# Patient Record
Sex: FEMALE | Race: WHITE
[De-identification: names, ages, dates, MRNs, and addresses within clinical notes are randomized per-mention and may not be internally consistent; named-entity substitution may affect disease eponyms.]

---

## 2019-12-12 ENCOUNTER — HOSPITAL ENCOUNTER (INPATIENT)
Dept: HOSPITAL 54 - GPS | Age: 59
LOS: 11 days | Discharge: NURSING, REHAB OR LONG-TERM CARE | DRG: 885 | End: 2019-12-23
Attending: INTERNAL MEDICINE | Admitting: PSYCHIATRY & NEUROLOGY
Payer: MEDICARE

## 2019-12-12 VITALS — DIASTOLIC BLOOD PRESSURE: 91 MMHG | SYSTOLIC BLOOD PRESSURE: 133 MMHG

## 2019-12-12 VITALS — SYSTOLIC BLOOD PRESSURE: 143 MMHG | DIASTOLIC BLOOD PRESSURE: 79 MMHG

## 2019-12-12 VITALS — DIASTOLIC BLOOD PRESSURE: 93 MMHG | SYSTOLIC BLOOD PRESSURE: 132 MMHG

## 2019-12-12 VITALS — DIASTOLIC BLOOD PRESSURE: 89 MMHG | SYSTOLIC BLOOD PRESSURE: 135 MMHG

## 2019-12-12 VITALS — SYSTOLIC BLOOD PRESSURE: 151 MMHG | DIASTOLIC BLOOD PRESSURE: 88 MMHG

## 2019-12-12 VITALS — WEIGHT: 185 LBS | BODY MASS INDEX: 29.73 KG/M2 | HEIGHT: 66 IN

## 2019-12-12 DIAGNOSIS — E11.40: ICD-10-CM

## 2019-12-12 DIAGNOSIS — F31.9: Primary | ICD-10-CM

## 2019-12-12 DIAGNOSIS — R45.851: ICD-10-CM

## 2019-12-12 DIAGNOSIS — F29: ICD-10-CM

## 2019-12-12 DIAGNOSIS — I10: ICD-10-CM

## 2019-12-12 DIAGNOSIS — L97.519: ICD-10-CM

## 2019-12-12 DIAGNOSIS — Z79.899: ICD-10-CM

## 2019-12-12 DIAGNOSIS — F41.9: ICD-10-CM

## 2019-12-12 DIAGNOSIS — E11.621: ICD-10-CM

## 2019-12-12 DIAGNOSIS — J96.00: ICD-10-CM

## 2019-12-12 DIAGNOSIS — Z91.5: ICD-10-CM

## 2019-12-12 DIAGNOSIS — L03.031: ICD-10-CM

## 2019-12-12 DIAGNOSIS — E03.9: ICD-10-CM

## 2019-12-12 DIAGNOSIS — Z96.653: ICD-10-CM

## 2019-12-12 DIAGNOSIS — Z91.19: ICD-10-CM

## 2019-12-12 LAB
ALBUMIN SERPL BCP-MCNC: 2.9 G/DL (ref 3.4–5)
ALP SERPL-CCNC: 70 U/L (ref 46–116)
ALT SERPL W P-5'-P-CCNC: 15 U/L (ref 12–78)
APPEARANCE UR: CLEAR
AST SERPL W P-5'-P-CCNC: 12 U/L (ref 15–37)
BILIRUB SERPL-MCNC: 0.3 MG/DL (ref 0.2–1)
BILIRUB UR QL STRIP: NEGATIVE
BUN SERPL-MCNC: 10 MG/DL (ref 7–18)
CALCIUM SERPL-MCNC: 8.5 MG/DL (ref 8.5–10.1)
CHLORIDE SERPL-SCNC: 99 MMOL/L (ref 98–107)
CHOLEST SERPL-MCNC: 116 MG/DL (ref ?–200)
CO2 SERPL-SCNC: 30 MMOL/L (ref 21–32)
COLOR UR: YELLOW
CREAT SERPL-MCNC: 0.6 MG/DL (ref 0.6–1.3)
GLUCOSE SERPL-MCNC: 104 MG/DL (ref 74–106)
GLUCOSE UR STRIP-MCNC: NEGATIVE MG/DL
HDLC SERPL-MCNC: 39 MG/DL (ref 40–60)
HGB UR QL STRIP: NEGATIVE ERY/UL
KETONES UR STRIP-MCNC: NEGATIVE MG/DL
LDLC SERPL DIRECT ASSAY-MCNC: 54 MG/DL (ref 0–99)
LEUKOCYTE ESTERASE UR QL STRIP: NEGATIVE
NITRITE UR QL STRIP: NEGATIVE
PH UR STRIP: 8 [PH] (ref 5–8)
POTASSIUM SERPL-SCNC: 3.9 MMOL/L (ref 3.5–5.1)
PROT SERPL-MCNC: 6.3 G/DL (ref 6.4–8.2)
PROT UR QL STRIP: NEGATIVE MG/DL
SODIUM SERPL-SCNC: 136 MMOL/L (ref 136–145)
TRIGL SERPL-MCNC: 162 MG/DL (ref 30–150)
UROBILINOGEN UR STRIP-MCNC: 0.2 EU/DL

## 2019-12-12 RX ADMIN — OXCARBAZEPINE SCH MG: 150 TABLET, FILM COATED ORAL at 17:09

## 2019-12-12 RX ADMIN — LEVOTHYROXINE SODIUM SCH MCG: 125 TABLET ORAL at 08:09

## 2019-12-12 RX ADMIN — Medication SCH EACH: at 12:11

## 2019-12-12 RX ADMIN — Medication SCH MG: at 06:13

## 2019-12-12 RX ADMIN — METOPROLOL TARTRATE SCH MG: 25 TABLET, FILM COATED ORAL at 20:30

## 2019-12-12 RX ADMIN — Medication SCH MG: at 17:07

## 2019-12-12 RX ADMIN — Medication SCH MG: at 17:09

## 2019-12-12 RX ADMIN — Medication SCH OZ: at 08:10

## 2019-12-12 RX ADMIN — ATORVASTATIN CALCIUM SCH MG: 40 TABLET, FILM COATED ORAL at 08:11

## 2019-12-12 RX ADMIN — Medication SCH EACH: at 08:08

## 2019-12-12 RX ADMIN — METOPROLOL TARTRATE SCH MG: 25 TABLET, FILM COATED ORAL at 08:19

## 2019-12-12 NOTE — NUR
GPS RN ADMISSION NOTES



PATIENT IS A 59 YEAR OLD FEMALE ADMITTED FROM [facility], PLACED ON A 5150 
HOLD FOR PSYCHOSIS. PER HOLD, PATIENT ADMITS SHE IS TREATED FOR BIPOLAR DISORDER BY MD BUT 
HAS NOT RECEIVED TREATMENT IN 3 MONTHS. PT. REPORTS, SHE IMPULSIVELY TOOK 60 PILLS OF 
CODEINE IN A SUICIDE ATTEMPT, NOT CARING IF SHE LIVES OR DIES. PATIENT DOES NOT EXPRESS ANY 
REMORSE IN REGARDS TO THE OVERDOSE. UPON FACE TO FACE ASSESSMENT, PATIENT IS ALERT AND 
ORIENTED X2, FORGETFUL, DISORIENTED, DISORGANIZED. CLEAR SPEECH. DENIES SI/HI/VAH AT THIS 
TIME. COOPERATIVE/UNCOOPERATIVE. FLAT AFFECT. VSS. NO ACUTE DISTRESS NOTED. NO C/O PAIN 
VERBALIZED. SKIN CHECK COMPLETE WITH PICTURES PLACED IN CHART, PT & WOUND CARE CONSULT 
ORDERED. PT. IS UNDER THE PSYCHIATRIC CARE OF DR. CHANG & THE MEDICAL CARE OF DR. MONTEZ. PT'S BELONGINGS WERE INVENTORIED & CHECKED FOR CONTRABAND. PT. EDUCATED ON THE 
USE OF CALL BELL. BED SIDE RAILS ARE UP X2 FOR SAFETY. REFUSE TO SIGN CONSENTS DUE TO BEING 
LETHARGIC AT THIS TIME. MRSA SWAB COMPLETE. PATIENT IS NONAMBULATORY/BED BOUND. PATIENT'S 
RIGHTS HANDBOOK GIVEN. MD MADE AWARE. JEANNE RAMÍREZ MADE AWARE OF PATIENT'S ADMISSION TO 
SOH/GPS UNIT. ALL NEEDS ATTENDED TO & MET. SAFETY MEASURES MAINTAINED. BED ALARM ON, BED IN 
LOW/LOCKED POSITION. WILL CONTINUE TO MONITOR PATIENT Q15 MINUTES FOR SAFETY AND BEHAVIOR.

## 2019-12-12 NOTE — NUR
GPS RN NOTE



PATIENT HAS H/O MRSA, NOW MEDICALLY CLEARED, NOT ON ISOLATION AT THIS TIME. CHARGE MADE 
NURSE AWARE, PER CHARGE NURSE.

## 2019-12-12 NOTE — NUR
Friend Contact: NIKOLAS called the pts friend, Carmelina ([phone]), and left a voicemail stating 
that the NIKOLAS would like to speak to her regarding the pts treatment and discharge plan.

-------------------------------------------------------------------------------

Addendum: 12/12/19 at 1544 by MAXWELL CONNOR

-------------------------------------------------------------------------------

Digna Dunbar*

## 2019-12-12 NOTE — NUR
Initial Discharge Plan: Pt currently resides with her friend, Digna ([phone]), in a 
home located at [address] [place] [address]; ([phone]). Per pt, she would 
like to return to her home. NIKOLAS will work with the pt and the MD regarding appropriate 
discharge planning. SW will form a safe and proper discharge plan.

## 2019-12-13 VITALS — SYSTOLIC BLOOD PRESSURE: 157 MMHG | DIASTOLIC BLOOD PRESSURE: 95 MMHG

## 2019-12-13 VITALS — SYSTOLIC BLOOD PRESSURE: 141 MMHG | DIASTOLIC BLOOD PRESSURE: 78 MMHG

## 2019-12-13 VITALS — SYSTOLIC BLOOD PRESSURE: 127 MMHG | DIASTOLIC BLOOD PRESSURE: 82 MMHG

## 2019-12-13 RX ADMIN — ATORVASTATIN CALCIUM SCH MG: 40 TABLET, FILM COATED ORAL at 08:24

## 2019-12-13 RX ADMIN — LEVOTHYROXINE SODIUM SCH MCG: 125 TABLET ORAL at 07:34

## 2019-12-13 RX ADMIN — OXCARBAZEPINE SCH MG: 150 TABLET, FILM COATED ORAL at 09:08

## 2019-12-13 RX ADMIN — CEPHALEXIN SCH MG: 500 CAPSULE ORAL at 21:12

## 2019-12-13 RX ADMIN — Medication SCH MG: at 17:49

## 2019-12-13 RX ADMIN — METOPROLOL TARTRATE SCH MG: 25 TABLET, FILM COATED ORAL at 08:24

## 2019-12-13 RX ADMIN — OXCARBAZEPINE SCH MG: 150 TABLET, FILM COATED ORAL at 17:49

## 2019-12-13 RX ADMIN — Medication SCH OZ: at 08:27

## 2019-12-13 RX ADMIN — Medication SCH MG: at 05:13

## 2019-12-13 RX ADMIN — METOPROLOL TARTRATE SCH MG: 25 TABLET, FILM COATED ORAL at 20:42

## 2019-12-13 RX ADMIN — DULOXETINE HYDROCHLORIDE SCH MG: 30 CAPSULE, DELAYED RELEASE ORAL at 08:27

## 2019-12-13 NOTE — NUR
Substance Abuse Intervention: SW conducted a substance abuse intervention with the pt 
regarding her overdose on codeine.

## 2019-12-13 NOTE — NUR
WOUND CARE CONSULT: PT PRESENTS WITH RT 2ND TOE ULCER, AND MULTIPLE AREAS OF 
BRUISING/DISCOLORATION TO SKIN INCLUDING RT ANKLE AREA, PRESENT ON ADMISSION. 
RECOMMENDATIONS MADE FOR SKIN PROTECTION. DISCUSSED WITH NURSING STAFF. DR BRIDGES AWARE 
OF DPM CONSULT REQUEST FOR TOE. WILL SEE PRN. 

-------------------------------------------------------------------------------

Addendum: 12/13/19 at 1020 by LYLA RICK WNDNU

-------------------------------------------------------------------------------

Amended: Links added.

## 2019-12-13 NOTE — NUR
Friend Contact: NIKOLAS called the pts friend, Digna ([phone]), and informed her that the pt 
is requesting to be discharged to [facility] and the pts friend stated that would 
be more appropriate because she can no longer care for her.

## 2019-12-14 VITALS — SYSTOLIC BLOOD PRESSURE: 120 MMHG | DIASTOLIC BLOOD PRESSURE: 80 MMHG

## 2019-12-14 VITALS — DIASTOLIC BLOOD PRESSURE: 89 MMHG | SYSTOLIC BLOOD PRESSURE: 134 MMHG

## 2019-12-14 VITALS — DIASTOLIC BLOOD PRESSURE: 85 MMHG | SYSTOLIC BLOOD PRESSURE: 138 MMHG

## 2019-12-14 RX ADMIN — CEPHALEXIN SCH MG: 500 CAPSULE ORAL at 21:12

## 2019-12-14 RX ADMIN — MUPIROCIN SCH APPLIC: 20 OINTMENT TOPICAL at 09:25

## 2019-12-14 RX ADMIN — DULOXETINE HYDROCHLORIDE SCH MG: 30 CAPSULE, DELAYED RELEASE ORAL at 09:24

## 2019-12-14 RX ADMIN — CEPHALEXIN SCH MG: 500 CAPSULE ORAL at 09:23

## 2019-12-14 RX ADMIN — Medication SCH MG: at 16:26

## 2019-12-14 RX ADMIN — METOPROLOL TARTRATE SCH MG: 25 TABLET, FILM COATED ORAL at 21:13

## 2019-12-14 RX ADMIN — OXCARBAZEPINE SCH MG: 150 TABLET, FILM COATED ORAL at 09:24

## 2019-12-14 RX ADMIN — OXCARBAZEPINE SCH MG: 150 TABLET, FILM COATED ORAL at 16:27

## 2019-12-14 RX ADMIN — Medication SCH MG: at 05:02

## 2019-12-14 RX ADMIN — ATORVASTATIN CALCIUM SCH MG: 40 TABLET, FILM COATED ORAL at 09:23

## 2019-12-14 RX ADMIN — LEVOTHYROXINE SODIUM SCH MCG: 125 TABLET ORAL at 09:24

## 2019-12-14 RX ADMIN — METOPROLOL TARTRATE SCH MG: 25 TABLET, FILM COATED ORAL at 09:23

## 2019-12-14 RX ADMIN — Medication SCH OZ: at 09:26

## 2019-12-15 VITALS — DIASTOLIC BLOOD PRESSURE: 90 MMHG | SYSTOLIC BLOOD PRESSURE: 141 MMHG

## 2019-12-15 VITALS — DIASTOLIC BLOOD PRESSURE: 88 MMHG | SYSTOLIC BLOOD PRESSURE: 135 MMHG

## 2019-12-15 VITALS — DIASTOLIC BLOOD PRESSURE: 89 MMHG | SYSTOLIC BLOOD PRESSURE: 128 MMHG

## 2019-12-15 RX ADMIN — Medication SCH MG: at 17:06

## 2019-12-15 RX ADMIN — CEPHALEXIN SCH MG: 500 CAPSULE ORAL at 21:38

## 2019-12-15 RX ADMIN — METOPROLOL TARTRATE SCH MG: 25 TABLET, FILM COATED ORAL at 08:48

## 2019-12-15 RX ADMIN — OXCARBAZEPINE SCH MG: 150 TABLET, FILM COATED ORAL at 17:06

## 2019-12-15 RX ADMIN — Medication SCH OZ: at 08:58

## 2019-12-15 RX ADMIN — METOPROLOL TARTRATE SCH MG: 25 TABLET, FILM COATED ORAL at 21:39

## 2019-12-15 RX ADMIN — Medication SCH MG: at 06:18

## 2019-12-15 RX ADMIN — OXCARBAZEPINE SCH MG: 150 TABLET, FILM COATED ORAL at 08:58

## 2019-12-15 RX ADMIN — CEPHALEXIN SCH MG: 500 CAPSULE ORAL at 08:49

## 2019-12-15 RX ADMIN — Medication SCH MG: at 17:05

## 2019-12-15 RX ADMIN — ATORVASTATIN CALCIUM SCH MG: 40 TABLET, FILM COATED ORAL at 08:49

## 2019-12-15 RX ADMIN — DULOXETINE HYDROCHLORIDE SCH MG: 30 CAPSULE, DELAYED RELEASE ORAL at 08:48

## 2019-12-15 RX ADMIN — MUPIROCIN SCH APPLIC: 20 OINTMENT TOPICAL at 08:58

## 2019-12-15 RX ADMIN — LEVOTHYROXINE SODIUM SCH MCG: 125 TABLET ORAL at 06:40

## 2019-12-15 NOTE — NUR
RN CLOSING NOTES: GPS OVERFLOW



PATIENT A/O X2-3 WITH PERIOD OF CONFUSION AND FORGETFULNESS, RESPONSIVE TO ALL STIMULI. 
SITTER ON BEDSIDE. RESPIRATION EVEN AND NON LABORED WITH NO ACUTE RESPIRATORY DISTRESS. 
ABDOMEN SOFT AND NON DISTENDED WITH ACTIVE BOWEL SOUNDS, BLADDER SCAN WITH 330 ML MAX, 
DIAPER REMAINS DRY. DENIES PAIN AND DISCOMFORT. SKIN WARM TO TOUCH AND DRY. BLE OFFLOAD. 
BEHAVIOR WITH CALM, COOPERATIVE, EPISODES OF CRYING FOR NO REASON, NO AH/VH,SI/HI, SUICIDAL 
VERBALIZATION. ALL CARE ATTENDED. BED IN LOCKED POSITION, SIDE RAILS X2, LOW BED. ENDORSED 
PATIENT CARE TO NEXT SHIFT.

## 2019-12-15 NOTE — NUR
RN NOTE- PT STATED ON INITIAL ASSESSMENT THIS MORNING THAT SHE HASN'T VOIDED IN 24 HOURS, 
THOUGH DENIES PAIN OR URGENCY. WHITNEY ALCANTAR ORDERED BLADDER SCAN AND IN AND OUT CATH FOR 
RETENTION > THAN 450 ML. BLADDER SCAN COMPLETED W FEMALE RAQUEL  ML RETENTION NOTED. IN 
AND OUT CATHETERIZATION PERFORMED BY FEMALE RAQUEL W 700ML UA REMOVED. NP KATHARINE NOTIFIED. 

-------------------------------------------------------------------------------

Addendum: 12/15/19 at 1156 by ELMO POTTER RN

-------------------------------------------------------------------------------

NP KATHARINE ORDERED CONTINUED MONITORING, BLADDER SCAN AND IN/OUT PRN.

## 2019-12-15 NOTE — NUR
RN NOTES GPS OVERFLOW



RECEIVED PATIENT FROM GPS TO MS 2, REPORT GOT FROM ELMO GARCÍA. A/O X3, RESPONSIVE TO ALL 
STIMULI. NO S/SX OF ACUTE RESPIRATORY DISTRESS. ABD SOFT AND NON DISTENDED, CONTINENT B&B. 
DIAPER DRY. NO BM TODAY. SKIN WARM TO TOUCH AND DRY. DENIES PAIN AND DISCOMFORT. SITTER 
PRESENT ON BED SIDE. ALL CONCERNS ATTENDED. BED IN LOCKED, LOW, SEMI YA POSITION. PT HAS 
NO BEHAVIOR OF SI/HI, AV/VH, SUICIDAL STATEMENT. WILL MONITOR CONTINUOUSLY.

## 2019-12-15 NOTE — NUR
RN NOTE- PT TRANSFERRED VIA GURNEY TO OVERFLOW AREA ROOM #206 AT THIS TIME. MEDICAL CHART, 
VALUABLES AND ONE TUBE BACTROBAN OINTMENT TRANSFERRED WITH PT. VSS, CALM, DIRECTABLE. REPORT 
GIVEN TO KILLIAN. RECEIVED AND UNDERSTOOD.

## 2019-12-15 NOTE — NUR
RN NOTES



DIAPER DRY. BLADDER SCAN  ML MAX. PT DENIES URGENCY, ABDOMINAL PAIN. WILL CONTINUE TO 
MONITOR.

## 2019-12-16 LAB
BASOPHILS # BLD AUTO: 0 /CMM (ref 0–0.2)
BASOPHILS NFR BLD AUTO: 0.5 % (ref 0–2)
BUN SERPL-MCNC: 17 MG/DL (ref 7–18)
CALCIUM SERPL-MCNC: 8.8 MG/DL (ref 8.5–10.1)
CHLORIDE SERPL-SCNC: 100 MMOL/L (ref 98–107)
CO2 SERPL-SCNC: 24 MMOL/L (ref 21–32)
CREAT SERPL-MCNC: 0.7 MG/DL (ref 0.6–1.3)
EOSINOPHIL NFR BLD AUTO: 1.4 % (ref 0–6)
GLUCOSE SERPL-MCNC: 137 MG/DL (ref 74–106)
HCT VFR BLD AUTO: 39 % (ref 33–45)
HGB BLD-MCNC: 12.7 G/DL (ref 11.5–14.8)
LYMPHOCYTES NFR BLD AUTO: 1.7 /CMM (ref 0.8–4.8)
LYMPHOCYTES NFR BLD AUTO: 20.6 % (ref 20–44)
MAGNESIUM SERPL-MCNC: 1.7 MG/DL (ref 1.8–2.4)
MCHC RBC AUTO-ENTMCNC: 33 G/DL (ref 31–36)
MCV RBC AUTO: 88 FL (ref 82–100)
MONOCYTES NFR BLD AUTO: 0.6 /CMM (ref 0.1–1.3)
MONOCYTES NFR BLD AUTO: 7.3 % (ref 2–12)
NEUTROPHILS # BLD AUTO: 5.9 /CMM (ref 1.8–8.9)
NEUTROPHILS NFR BLD AUTO: 70.2 % (ref 43–81)
PHOSPHATE SERPL-MCNC: 3 MG/DL (ref 2.5–4.9)
PLATELET # BLD AUTO: 308 /CMM (ref 150–450)
POTASSIUM SERPL-SCNC: 3.8 MMOL/L (ref 3.5–5.1)
RBC # BLD AUTO: 4.39 MIL/UL (ref 4–5.2)
SODIUM SERPL-SCNC: 134 MMOL/L (ref 136–145)
WBC NRBC COR # BLD AUTO: 8.3 K/UL (ref 4.3–11)

## 2019-12-16 RX ADMIN — CEPHALEXIN SCH MG: 500 CAPSULE ORAL at 08:22

## 2019-12-16 RX ADMIN — METOPROLOL TARTRATE SCH MG: 25 TABLET, FILM COATED ORAL at 08:24

## 2019-12-16 RX ADMIN — ACETAMINOPHEN PRN MG: 325 TABLET ORAL at 04:30

## 2019-12-16 RX ADMIN — OXCARBAZEPINE SCH MG: 150 TABLET, FILM COATED ORAL at 08:22

## 2019-12-16 RX ADMIN — METOPROLOL TARTRATE SCH MG: 25 TABLET, FILM COATED ORAL at 21:34

## 2019-12-16 RX ADMIN — OXCARBAZEPINE SCH MG: 150 TABLET, FILM COATED ORAL at 17:48

## 2019-12-16 RX ADMIN — Medication SCH OZ: at 08:25

## 2019-12-16 RX ADMIN — ACETAMINOPHEN PRN MG: 325 TABLET ORAL at 18:39

## 2019-12-16 RX ADMIN — MUPIROCIN SCH APPLIC: 20 OINTMENT TOPICAL at 08:25

## 2019-12-16 RX ADMIN — DULOXETINE HYDROCHLORIDE SCH MG: 30 CAPSULE, DELAYED RELEASE ORAL at 08:21

## 2019-12-16 RX ADMIN — Medication SCH MG: at 17:48

## 2019-12-16 RX ADMIN — Medication SCH MG: at 06:09

## 2019-12-16 RX ADMIN — LEVOTHYROXINE SODIUM SCH MCG: 125 TABLET ORAL at 07:53

## 2019-12-16 RX ADMIN — ATORVASTATIN CALCIUM SCH MG: 40 TABLET, FILM COATED ORAL at 08:22

## 2019-12-16 RX ADMIN — Medication SCH MG: at 17:47

## 2019-12-16 RX ADMIN — CEPHALEXIN SCH MG: 500 CAPSULE ORAL at 21:33

## 2019-12-16 NOTE — NUR
MS RN NOTES

PATIENT IN BED ALERT ORIENTED X 3. NO ACUTE DISTRESS NOTED. BREATHING UNLABORED.  SITTER AT 
BEDSIDE. SAFETY MEASURES IN PLACE. CALL LIGHT WITHIN REACH. WILL CONTINUE TO MONITOR 
ACCORDINGLY.

## 2019-12-16 NOTE — NUR
MS RN NOTES

PATIENT IN BED ALERT ORIENTED X 3. NO ACUTE DISTRESS NOTED. BREATHING UNLABORED. IV ACCESS 
PATIENT AND INTACT. NO REDNESS OR SWELLING NOTED. SITTER AT BEDSIDE. SAFETY MEASURES IN 
PLACE. CALL LIGHT WITHIN REACH. WILL CONTINUE TO MONITOR ACCORDINGLY.

-------------------------------------------------------------------------------

Addendum: 12/16/19 at 1441 by AMARJIT LOZOYA RN

-------------------------------------------------------------------------------

DISREGARD ABOVE NOTES, WRONG PATIENT.

## 2019-12-16 NOTE — NUR
MS RN NOTES



AWAKE & RESPONSIVE. NOT IN ANY DISTRESS. NO SOB NOTED. DENIES ANY PAIN OR DISCOMFORT AT THIS 
TIME. AM CARE DONE. MONITORED ACCORDINGLY. CALL LIGHT WITHIN REACH. BED IN LOWEST POSITION. 
SR UP X 2 FOR SAFETY. WILL ENDORSE TO NEXT SHIFT.

## 2019-12-16 NOTE — NUR
RN OPENING NOTES



RECEIVED REPORT FROM RACHAEL RNJURGEN. FOUND Pt AWAKE, RESTING IN BED. NO S/S OF ACUTE 
DISTRESS OR SOB NOTED. SITTER AT BEDSIDE. Pt IS A GPS OVF Pt ON A 5250 HOLD, EXP DATE: 
12/26/19. Pt IS A/OX3, VERBAL, ABLE TO MAKE NEEDS KNOWN. NO IV ACCESS PER GPS PROTOCOL. 
SAFETY MEASURES IN PLACE. BED LOW, LOCKED, HOB ELEVATED, SIDE RAILS UP, BED ALARM ON. SITTER 
AT BEDSIDE. WILL CONTINUE TO MONITOR Pt's CONDITION AND SAFETY THROUGHOUT THE NIGHT.

## 2019-12-16 NOTE — NUR
MS RN NOTES

PATIENT IN BED ALERT ORIENTED X 3. NO ACUTE DISTRESS NOTED. BREATHING UNLABORED.  SITTER AT 
BEDSIDE. DUE MEDICATIONS GIVEN, NO ASE NOTED. NEEDS ATTENDED AND ANTICIPATED. KEPT CLEAN DRY 
AND COMFORTABLE. SAFETY MEASURES IN PLACE. CALL LIGHT WITHIN REACH. WILL ENDORSE TO NIGHT 
NURSE FOR CONTINUITY OF CARE.

## 2019-12-16 NOTE — NUR
SNF Referral: NIKOLAS faxed a referral to [facility] with attention to 
Admissions to the fax number: [phone].

## 2019-12-16 NOTE — NUR
MS RN NOTES



PT UNABLE TO VOID. CHECKED RESIDUAL WITH BLADDER SCANNER = >611ML. PT REQUESTED FOR FEMALE 
NURSE TO DO STRAIGHT CATH. SHAINA RN DID STRAIGHT CATH AS ORDERED. DRAINED 700ML YELLOWISH 
OUTPUT. PT TOLERATED PROCEDURE WELL. WILL CONTINUE TO MONITOR.

## 2019-12-16 NOTE — NUR
MS RN NOTES

CLARIFIED WITH NP NATALIE CORRALES REGARDING ACCUCHECK ACHS AND INSULIN SAID DISCONTINUED AND 
WILL NOT ORDER.

## 2019-12-17 VITALS — DIASTOLIC BLOOD PRESSURE: 85 MMHG | SYSTOLIC BLOOD PRESSURE: 127 MMHG

## 2019-12-17 VITALS — DIASTOLIC BLOOD PRESSURE: 85 MMHG | SYSTOLIC BLOOD PRESSURE: 145 MMHG

## 2019-12-17 VITALS — DIASTOLIC BLOOD PRESSURE: 80 MMHG | SYSTOLIC BLOOD PRESSURE: 129 MMHG

## 2019-12-17 RX ADMIN — CEPHALEXIN SCH MG: 500 CAPSULE ORAL at 21:10

## 2019-12-17 RX ADMIN — METOPROLOL TARTRATE SCH MG: 25 TABLET, FILM COATED ORAL at 21:13

## 2019-12-17 RX ADMIN — Medication SCH OZ: at 08:25

## 2019-12-17 RX ADMIN — MUPIROCIN SCH APPLIC: 20 OINTMENT TOPICAL at 08:24

## 2019-12-17 RX ADMIN — METOPROLOL TARTRATE SCH MG: 25 TABLET, FILM COATED ORAL at 08:24

## 2019-12-17 RX ADMIN — CEPHALEXIN SCH MG: 500 CAPSULE ORAL at 08:24

## 2019-12-17 RX ADMIN — POLYETHYLENE GLYCOL 3350 PRN GM: 17 POWDER, FOR SOLUTION ORAL at 23:21

## 2019-12-17 RX ADMIN — DULOXETINE HYDROCHLORIDE SCH MG: 30 CAPSULE, DELAYED RELEASE ORAL at 08:24

## 2019-12-17 RX ADMIN — OXCARBAZEPINE SCH MG: 150 TABLET, FILM COATED ORAL at 08:24

## 2019-12-17 RX ADMIN — OXCARBAZEPINE SCH MG: 150 TABLET, FILM COATED ORAL at 16:04

## 2019-12-17 RX ADMIN — Medication SCH MG: at 16:25

## 2019-12-17 RX ADMIN — ATORVASTATIN CALCIUM SCH MG: 40 TABLET, FILM COATED ORAL at 08:24

## 2019-12-17 RX ADMIN — ACETAMINOPHEN PRN MG: 325 TABLET ORAL at 23:21

## 2019-12-17 NOTE — NUR
RN CLOSING NOTE



PT IN BED AT LOWEST AND LOCKED POSITION WITH SIDE RAILS UP X2, A/O X3 BREATHING EVEN AND 
UNLABORED ON RA, NO S/S OF DISTRESS OR PAIN, IV IS PATENT AND INTACT,  SITTER AT BEDSIDE, 
SAFETY PRECAUTIONS IN PLACE, CALL LIGHT IN REACH, ALL NEEDS ATTENDED TO, WILL ENDORSE TO 
NIGHT RN FOR TRISTON.

## 2019-12-17 NOTE — NUR
rn notes:

spoked with hospitalist on call regarding pt's request for miralax. according to pt, miralax 
is the only medication that works with her constipation problems. md t/o maegan to give 
miralax 17gm powder prn daily for constipation

## 2019-12-17 NOTE — NUR
RN OPENING NOTE



PT WAS RECEIVED IN BED AT LOWEST AND LOCKED POSITION WITH SIDE RAILS UP X2, A/O X3 BREATHING 
EVEN AND UNLABORED ON RA, NO S/S OF ANY DISTRESS OR PAIN AT THIS TIME, IV IS PATENT AND 
INTACT, ON 525O HOLD THAT EXPIRES 12/26/19, SITTER AT BEDSIDE, SAFETY PRECAUTIONS IN PLACE, 
CALL LIGHT IN REACH, WILL MONITOR ACCORDINGLY

## 2019-12-17 NOTE — NUR
prn tylenol and miralax:

pt c/o mild head ache requesting for tylenol, also requested for miralax for constipation, 
prn tylenol 650mg tab po and miralax administered at this time.

## 2019-12-17 NOTE — NUR
rn initial notes:

met with pt at bed side, a/o x3, on ra respirations even and unlabored. denies any si/hi at 
this time. cooperative and compliant with medication. incontinent. sitter at bedside. 
appears calm, cooperative at times. safety precautions for fall initiated, side rails up x3 
for safety, will continue monitoring pt [id number] for safety and any changes in behavior.

## 2019-12-18 VITALS — DIASTOLIC BLOOD PRESSURE: 85 MMHG | SYSTOLIC BLOOD PRESSURE: 119 MMHG

## 2019-12-18 VITALS — SYSTOLIC BLOOD PRESSURE: 131 MMHG | DIASTOLIC BLOOD PRESSURE: 76 MMHG

## 2019-12-18 RX ADMIN — METOPROLOL TARTRATE SCH MG: 25 TABLET, FILM COATED ORAL at 22:28

## 2019-12-18 RX ADMIN — CEPHALEXIN SCH MG: 500 CAPSULE ORAL at 22:28

## 2019-12-18 RX ADMIN — Medication SCH MG: at 17:05

## 2019-12-18 RX ADMIN — LEVOTHYROXINE SODIUM SCH MCG: 125 TABLET ORAL at 06:40

## 2019-12-18 RX ADMIN — CEPHALEXIN SCH MG: 500 CAPSULE ORAL at 08:19

## 2019-12-18 RX ADMIN — Medication SCH MG: at 06:38

## 2019-12-18 RX ADMIN — METOPROLOL TARTRATE SCH MG: 25 TABLET, FILM COATED ORAL at 08:20

## 2019-12-18 RX ADMIN — POLYETHYLENE GLYCOL 3350 PRN GM: 17 POWDER, FOR SOLUTION ORAL at 12:40

## 2019-12-18 RX ADMIN — OXCARBAZEPINE SCH MG: 150 TABLET, FILM COATED ORAL at 08:19

## 2019-12-18 RX ADMIN — DULOXETINE HYDROCHLORIDE SCH MG: 30 CAPSULE, DELAYED RELEASE ORAL at 08:19

## 2019-12-18 RX ADMIN — Medication SCH OZ: at 08:21

## 2019-12-18 RX ADMIN — ACETAMINOPHEN PRN MG: 325 TABLET ORAL at 12:39

## 2019-12-18 RX ADMIN — MUPIROCIN SCH APPLIC: 20 OINTMENT TOPICAL at 08:28

## 2019-12-18 RX ADMIN — OXCARBAZEPINE SCH MG: 150 TABLET, FILM COATED ORAL at 17:05

## 2019-12-18 RX ADMIN — ATORVASTATIN CALCIUM SCH MG: 40 TABLET, FILM COATED ORAL at 08:19

## 2019-12-18 NOTE — NUR
SNF Contact: SW called [facility] ([facility]) and spoke to Piedad [phone] who stated that she will follow up on the referral and get back to the SW.

## 2019-12-18 NOTE — NUR
PT C/O URINARY RETENTION SAYING SHE HAS THE URGE BUT UNABLE TO VOID.BLADDER SCAN DONE WITH 
483 ML URINE OUTPUT.STRAIGHT CATH DONE OBTAINED 700 ML CLEAR YELLOW URINE OUTPUT.PT 
TOLERATED WELL.INSTRUCTED PT TO TURNED FROM SIDE TO SIDE WHILE IN BED TO AVOID SKIN 
BREAKDOWN BUT PT REFUSED INSPITE OF EXPLAINING ITS RISKS AND BENEFITS.

## 2019-12-18 NOTE — NUR
MS/RN NOTES



PT. TRANSFERRED TO GPS ROOM 219 A VIA [place]. PT. LEFT THE FLOOR IN STABLE CONDITION 
ACCOMPANIED BY CNA'S. REPORT AND PATIENT GIVEN TO RAQUEL BIRCH FOR CONTINUITY OF CARE.

## 2019-12-18 NOTE — NUR
BLADDER SCAN DONE = 432, PATIENT COOPERATIVE DURING THE PROCEDURE, WILL CONTINUE TO MONITOR 
Q15 MINS FOR SAFETY

## 2019-12-18 NOTE — NUR
RN CLOSING NOTES:

PT REMAINS COOPERATIVE, COMPLIANT WITH MEDICATIONS. REMAINS ON 5250 HOLD, EXPIRES ON 
12/26/19. REFUSED TURNING AND REPOSITIONING DESPITE PROVIDING EDUCATION.  VS REMAINS STABLE, 
NEEDS ATTENDED. SAFETY PRECAUTIONS FOR FALL REMAINS ENGAGED,1:1 SITTER AT BED SIDE, WILL 
ENDORSE TO DAY RN FOR CONTINUITY OF CARE.

## 2019-12-18 NOTE — NUR
RN NOTES:

PT REQUESTED TO DO BLADDER SCAN. PERFORMED BLADDER SCAN, SHOWING RESULT OF >525ML. STRAIGHT 
CATH PERFORMED, OBTAINED YELLOW COLORED URINE, 700ML TOTAL OUTPUT. ROLA CARE PERFORMED 
BEFORE AND AFTER PROCEDURE.

## 2019-12-18 NOTE — NUR
MS 2 RN OPENING NOTE



PT WAS RECEIVED IN BED AT LOWEST AND LOCKED POSITION WITH SIDE RAILS UP X2, A/O X3 BREATHING 
EVEN AND UNLABORED ON RA, NO S/S OF ANY DISTRESS OR PAIN AT THIS TIME, IV IS PATENT AND 
INTACT, ON 525O HOLD THAT EXPIRES 12/26/19, WITH 1:1 SITTER AT BEDSIDE, SAFETY PRECAUTIONS 
IN PLACE, CALL LIGHT IN REACH, WILL MONITOR ACCORDINGLY

## 2019-12-18 NOTE — NUR
MS/RN NOTES



RECEIVED PT. LYING IN BED. PT. IS AWAKE, ALERT AND ORIENTED X3. BREATHING EVEN AND UNLABORED 
ON ROOM AIR. NO SOB, RESPIRATORY DISTRESS OR COMPLAINTS OF PAIN NOTED AT THIS TIME. NO SI/HI 
NOTED AT THIS TIME. PT. HAS NO IV ACCESS MD AWARE. PER DAYSHIFT NURSE, DAYSHIFT SUPERVISOR 
CALLED AND PT. IS GOING TO BE TRANSFERRED BACK TO [facility] ROOM 212B. BED LOCKED AND IN 
LOWEST POSITION, SIDE RAILS UP X3, BED ALARM ON, CALL LIGHT WITHIN REACH, WILL CONTINUE TO 
MONITOR.

## 2019-12-18 NOTE — NUR
GPS RN NOTE



RECEIVED REPORT FROM GPS RAQUEL BIRCH ABOUT -1, PATIENT IS ASLEEP AT THIS TIME. NO CHANGES 
NOTED. WILL CONTINUE TO ASSIST WITH ADL CARE. BED IN LOW LOCKED POSITION. BED ALARM ON. 
ENVIRONMENTAL SAFETY CHECKS DONE. CALL BELL WITHIN REACH. WILL CONTINUE Q15 MINS CHECKS FOR 
SAFETY.

## 2019-12-19 VITALS — SYSTOLIC BLOOD PRESSURE: 118 MMHG | DIASTOLIC BLOOD PRESSURE: 70 MMHG

## 2019-12-19 VITALS — DIASTOLIC BLOOD PRESSURE: 78 MMHG | SYSTOLIC BLOOD PRESSURE: 126 MMHG

## 2019-12-19 VITALS — SYSTOLIC BLOOD PRESSURE: 117 MMHG | DIASTOLIC BLOOD PRESSURE: 67 MMHG

## 2019-12-19 RX ADMIN — MUPIROCIN SCH APPLIC: 20 OINTMENT TOPICAL at 08:44

## 2019-12-19 RX ADMIN — Medication SCH MG: at 06:36

## 2019-12-19 RX ADMIN — ATORVASTATIN CALCIUM SCH MG: 40 TABLET, FILM COATED ORAL at 08:27

## 2019-12-19 RX ADMIN — Medication SCH OZ: at 08:44

## 2019-12-19 RX ADMIN — TAMSULOSIN HYDROCHLORIDE SCH MG: 0.4 CAPSULE ORAL at 21:09

## 2019-12-19 RX ADMIN — CEPHALEXIN SCH MG: 500 CAPSULE ORAL at 08:27

## 2019-12-19 RX ADMIN — CEPHALEXIN SCH MG: 500 CAPSULE ORAL at 21:08

## 2019-12-19 RX ADMIN — LEVOTHYROXINE SODIUM SCH MCG: 125 TABLET ORAL at 07:58

## 2019-12-19 RX ADMIN — Medication SCH MG: at 16:48

## 2019-12-19 RX ADMIN — OXCARBAZEPINE SCH MG: 150 TABLET, FILM COATED ORAL at 16:48

## 2019-12-19 RX ADMIN — POLYETHYLENE GLYCOL 3350 PRN GM: 17 POWDER, FOR SOLUTION ORAL at 07:58

## 2019-12-19 RX ADMIN — METOPROLOL TARTRATE SCH MG: 25 TABLET, FILM COATED ORAL at 21:09

## 2019-12-19 RX ADMIN — DULOXETINE HYDROCHLORIDE SCH MG: 30 CAPSULE, DELAYED RELEASE ORAL at 08:27

## 2019-12-19 RX ADMIN — OXCARBAZEPINE SCH MG: 150 TABLET, FILM COATED ORAL at 08:27

## 2019-12-19 RX ADMIN — METOPROLOL TARTRATE SCH MG: 25 TABLET, FILM COATED ORAL at 08:28

## 2019-12-19 NOTE — NUR
BLADDER SCAN DONE = 586, STRAIGHT CATH DONE USING STERILE TECHNIQUE, PATIENT TOLERATED THE 
PROCEDURE, NO S/S OF PAIN AND DISCOMFORT. OBTAINED 900CC CLEAR YELLOW URINE. WILL CONTINUE 
TO MONITOR Q15 MINS FOR SAFETY

## 2019-12-19 NOTE — NUR
GPS/RN NOTE:



AWAKE, ALERT, ORIENTED X3, HAD MODERATE AMOUNT OF SOFT STOOL AFTER GIVEN A SUPPOSITORY 
DURING DAYSHIFT. NO APPARENT DISTRESS NOTED. CALM AND COOPERATIVE. WILL CONTINUE TO MONITOR.

## 2019-12-19 NOTE — NUR
GPS RN NOTE



PATIENT IS SLEEPING COMFORTABLY IN BED. NO ACUTE CHANGES NOTED. WILL CONTINUE TO MONITOR Q15 
MINS FOR SAFETY, BEHAVIOR & COMFORT.

## 2019-12-19 NOTE — NUR
SNF Contact: SW called [facility] ([facility]) and spoke to Miguel Angel who stated that 
the pt was denied from their facility.

## 2019-12-19 NOTE — NUR
SNF Contact: Li [phone] from [facility] called the SW and stated that 
the pt is pending acceptance at the moment and that she will send a staff member to evaluate 
the pt. SW stated that would be acceptable.

## 2019-12-19 NOTE — NUR
SNF Contact: NIKOLAS faxed a referral to [facility] with attn to Li to the fax 
number: [phone].

## 2019-12-19 NOTE — NUR
SNF Contact: NIKOLAS called Li [phone] from [facility] and was informed that 
the pt was accepted to their facility.

## 2019-12-19 NOTE — NUR
Friend Contact: NIKOLAS called the pts friend, Digna ([phone]), and left a voicemail stating 
that the pt is going to be discharged to [facility] the following day.

## 2019-12-20 VITALS — DIASTOLIC BLOOD PRESSURE: 87 MMHG | SYSTOLIC BLOOD PRESSURE: 127 MMHG

## 2019-12-20 VITALS — SYSTOLIC BLOOD PRESSURE: 121 MMHG | DIASTOLIC BLOOD PRESSURE: 76 MMHG

## 2019-12-20 VITALS — SYSTOLIC BLOOD PRESSURE: 120 MMHG | DIASTOLIC BLOOD PRESSURE: 83 MMHG

## 2019-12-20 RX ADMIN — Medication SCH MG: at 17:15

## 2019-12-20 RX ADMIN — Medication SCH OZ: at 08:43

## 2019-12-20 RX ADMIN — ATORVASTATIN CALCIUM SCH MG: 40 TABLET, FILM COATED ORAL at 08:39

## 2019-12-20 RX ADMIN — DULOXETINE HYDROCHLORIDE SCH MG: 30 CAPSULE, DELAYED RELEASE ORAL at 08:38

## 2019-12-20 RX ADMIN — ACETAMINOPHEN PRN MG: 325 TABLET ORAL at 01:52

## 2019-12-20 RX ADMIN — CEPHALEXIN SCH MG: 500 CAPSULE ORAL at 20:14

## 2019-12-20 RX ADMIN — TAMSULOSIN HYDROCHLORIDE SCH MG: 0.4 CAPSULE ORAL at 21:11

## 2019-12-20 RX ADMIN — Medication SCH MG: at 05:45

## 2019-12-20 RX ADMIN — Medication SCH MG: at 17:14

## 2019-12-20 RX ADMIN — METOPROLOL TARTRATE SCH MG: 25 TABLET, FILM COATED ORAL at 21:11

## 2019-12-20 RX ADMIN — POLYETHYLENE GLYCOL 3350 PRN GM: 17 POWDER, FOR SOLUTION ORAL at 13:09

## 2019-12-20 RX ADMIN — MUPIROCIN SCH APPLIC: 20 OINTMENT TOPICAL at 08:43

## 2019-12-20 RX ADMIN — METOPROLOL TARTRATE SCH MG: 25 TABLET, FILM COATED ORAL at 08:39

## 2019-12-20 RX ADMIN — LEVOTHYROXINE SODIUM SCH MCG: 125 TABLET ORAL at 08:39

## 2019-12-20 RX ADMIN — OXCARBAZEPINE SCH MG: 150 TABLET, FILM COATED ORAL at 17:14

## 2019-12-20 RX ADMIN — CEPHALEXIN SCH MG: 500 CAPSULE ORAL at 08:39

## 2019-12-20 RX ADMIN — TEMAZEPAM PRN MG: 7.5 CAPSULE ORAL at 01:53

## 2019-12-20 RX ADMIN — OXCARBAZEPINE SCH MG: 150 TABLET, FILM COATED ORAL at 08:38
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
## 2019-12-20 NOTE — NUR
GPS/RN NOTE:



SLEPT X8 HRS. LAST NIGHT, MED COMPLIANT, BEDREST, STATED THAT SHE CANNOT AMBULATE. HAD 
PHYSICAL THERAPY YESTERDAY, BOTH LOWER LEGS WEAK, NON-AMBULATORY. INCONTINENT OF BOTH 
BLADDER AND BOWEL. HAD BOWEL MOVEMENT AROUND 1900, STOOL SOFT, REGULAR AMOUNT. TOOK HER 
COLACE 100 MG AT 0600 THIS MORNING, SAYING THAT HER STOOL NOT ENOUGH.

## 2019-12-20 NOTE — NUR
GPS/RN NOTE:



RESTNG IN BED, AWAKE, ALERT, ORIENTED X3, DENIES ANY PAIN, COMFORTABLE, CALM, APPROPRIATE, 
CALM, COOPERATIVE. DENIES ANY PAIN AT THIS TIME. NO APPARENT DISTRESS NOTED. WILL CONTINUE 
TO MONITOR Q 15 MINS. TO MAINTAIN SAFETY.

## 2019-12-20 NOTE — NUR
GPS/RN NOTE:



PATIENT TRIED TO URINATE X2, UNABLE TO DO IT. BLADDER SCAN DONE 646 ML OF URINE RETAINED IN 
THE BLADDER. IN AND OUT CATHETER DONE, OBTAINED 525 ML OF CLEAR YELLOW OUTPUT.

## 2019-12-20 NOTE — NUR
GPS/RN NOTE:



C/O INSOMNIA, TEMAZEPAM 7.5 MG CAP PO GIVEN. ALSO C/O GEN. BODY PAIN, TYLENOL 650 MG TAB P 
GIVEN.

## 2019-12-20 NOTE — NUR
Friend Contact: Pt's friend, Digna ([phone]), called the SW back and stated that she 
understands the pt will remain in the hospital over the weekend and that she will be 
discharged to a nursing home for a temporary period of time on Monday.

## 2019-12-21 VITALS — SYSTOLIC BLOOD PRESSURE: 109 MMHG | DIASTOLIC BLOOD PRESSURE: 67 MMHG

## 2019-12-21 VITALS — DIASTOLIC BLOOD PRESSURE: 74 MMHG | SYSTOLIC BLOOD PRESSURE: 136 MMHG

## 2019-12-21 VITALS — SYSTOLIC BLOOD PRESSURE: 119 MMHG | DIASTOLIC BLOOD PRESSURE: 71 MMHG

## 2019-12-21 RX ADMIN — TAMSULOSIN HYDROCHLORIDE SCH MG: 0.4 CAPSULE ORAL at 21:22

## 2019-12-21 RX ADMIN — Medication SCH MG: at 10:07

## 2019-12-21 RX ADMIN — CEPHALEXIN SCH MG: 500 CAPSULE ORAL at 20:53

## 2019-12-21 RX ADMIN — Medication SCH MG: at 05:41

## 2019-12-21 RX ADMIN — CEPHALEXIN SCH MG: 500 CAPSULE ORAL at 10:07

## 2019-12-21 RX ADMIN — DULOXETINE HYDROCHLORIDE SCH MG: 30 CAPSULE, DELAYED RELEASE ORAL at 10:07

## 2019-12-21 RX ADMIN — MUPIROCIN SCH APPLIC: 20 OINTMENT TOPICAL at 14:02

## 2019-12-21 RX ADMIN — LEVOTHYROXINE SODIUM SCH MCG: 125 TABLET ORAL at 10:07

## 2019-12-21 RX ADMIN — Medication SCH MG: at 18:02

## 2019-12-21 RX ADMIN — METOPROLOL TARTRATE SCH MG: 25 TABLET, FILM COATED ORAL at 10:00

## 2019-12-21 RX ADMIN — METOPROLOL TARTRATE SCH MG: 25 TABLET, FILM COATED ORAL at 20:53

## 2019-12-21 RX ADMIN — OXCARBAZEPINE SCH MG: 150 TABLET, FILM COATED ORAL at 18:03

## 2019-12-21 RX ADMIN — ATORVASTATIN CALCIUM SCH MG: 40 TABLET, FILM COATED ORAL at 10:06

## 2019-12-21 RX ADMIN — POLYETHYLENE GLYCOL 3350 PRN GM: 17 POWDER, FOR SOLUTION ORAL at 11:40

## 2019-12-21 RX ADMIN — OXCARBAZEPINE SCH MG: 150 TABLET, FILM COATED ORAL at 10:06

## 2019-12-21 RX ADMIN — Medication SCH OZ: at 10:43

## 2019-12-21 NOTE — NUR
BLADDER SCANNING DONE-BY NOC NURSE,ACCORDING TO NOC NURSE HAD JUST PASSED A LOT OF URINE 
AROUND 0515 AM.AT THIS TIME,LARGEST RETAINED AMT. 405 ML.

## 2019-12-21 NOTE — NUR
GPS/RN NOTE:

PATIENT INCONTINENT, DIAPER HEAVILY WET WITH URINE AROUND 0515 THIS MORNING, CHARGE NURSE 
MADE AWARE.

## 2019-12-21 NOTE — NUR
GPS/RN NOTE:



UP IN BED, CALM, QUIET, COMFORTABLE, DENIES ANY PAIN. APPROPRIATE, PLEASANT. NO APPARENT 
DISTRESS NOTED. WILL CONTINUE TO MONITOR.

## 2019-12-22 VITALS — DIASTOLIC BLOOD PRESSURE: 72 MMHG | SYSTOLIC BLOOD PRESSURE: 97 MMHG

## 2019-12-22 VITALS — DIASTOLIC BLOOD PRESSURE: 81 MMHG | SYSTOLIC BLOOD PRESSURE: 111 MMHG

## 2019-12-22 VITALS — DIASTOLIC BLOOD PRESSURE: 83 MMHG | SYSTOLIC BLOOD PRESSURE: 111 MMHG

## 2019-12-22 RX ADMIN — Medication SCH MG: at 05:21

## 2019-12-22 RX ADMIN — OXCARBAZEPINE SCH MG: 150 TABLET, FILM COATED ORAL at 16:45

## 2019-12-22 RX ADMIN — LEVOTHYROXINE SODIUM SCH MCG: 125 TABLET ORAL at 07:38

## 2019-12-22 RX ADMIN — ATORVASTATIN CALCIUM SCH MG: 40 TABLET, FILM COATED ORAL at 08:37

## 2019-12-22 RX ADMIN — Medication SCH MG: at 16:45

## 2019-12-22 RX ADMIN — MUPIROCIN SCH APPLIC: 20 OINTMENT TOPICAL at 08:38

## 2019-12-22 RX ADMIN — METOPROLOL TARTRATE SCH MG: 25 TABLET, FILM COATED ORAL at 09:01

## 2019-12-22 RX ADMIN — DULOXETINE HYDROCHLORIDE SCH MG: 30 CAPSULE, DELAYED RELEASE ORAL at 08:37

## 2019-12-22 RX ADMIN — Medication SCH OZ: at 08:38

## 2019-12-22 RX ADMIN — TEMAZEPAM PRN MG: 7.5 CAPSULE ORAL at 21:09

## 2019-12-22 RX ADMIN — TAMSULOSIN HYDROCHLORIDE SCH MG: 0.4 CAPSULE ORAL at 21:06

## 2019-12-22 RX ADMIN — OXCARBAZEPINE SCH MG: 150 TABLET, FILM COATED ORAL at 08:36

## 2019-12-22 RX ADMIN — METOPROLOL TARTRATE SCH MG: 25 TABLET, FILM COATED ORAL at 08:37

## 2019-12-22 RX ADMIN — METOPROLOL TARTRATE SCH MG: 25 TABLET, FILM COATED ORAL at 21:00

## 2019-12-22 RX ADMIN — CEPHALEXIN SCH MG: 500 CAPSULE ORAL at 08:37

## 2019-12-22 RX ADMIN — ACETAMINOPHEN PRN MG: 325 TABLET ORAL at 21:07

## 2019-12-22 RX ADMIN — CEPHALEXIN SCH MG: 500 CAPSULE ORAL at 21:05

## 2019-12-22 NOTE — NUR
CNA reported that pt is refusing to be turned. Pt states, "im ok i don't want to be turned. 
I want to sleep don't bother me". Explained pt risk and benefits and still refused. Kept pt 
clean and comfortable. Will continue to monitor.

## 2019-12-22 NOTE — NUR
Post 1 hour Tylenol effective. TX 0/10. Restoril effective. Pt asleep in bed easy to arouse. 
Will continue to monitor.

## 2019-12-23 VITALS — SYSTOLIC BLOOD PRESSURE: 144 MMHG | DIASTOLIC BLOOD PRESSURE: 87 MMHG

## 2019-12-23 RX ADMIN — MUPIROCIN SCH APPLIC: 20 OINTMENT TOPICAL at 09:02

## 2019-12-23 RX ADMIN — LEVOTHYROXINE SODIUM SCH MCG: 125 TABLET ORAL at 08:58

## 2019-12-23 RX ADMIN — Medication SCH MG: at 05:03

## 2019-12-23 RX ADMIN — OXCARBAZEPINE SCH MG: 150 TABLET, FILM COATED ORAL at 08:58

## 2019-12-23 RX ADMIN — DULOXETINE HYDROCHLORIDE SCH MG: 30 CAPSULE, DELAYED RELEASE ORAL at 08:58

## 2019-12-23 RX ADMIN — METOPROLOL TARTRATE SCH MG: 25 TABLET, FILM COATED ORAL at 08:59

## 2019-12-23 RX ADMIN — ATORVASTATIN CALCIUM SCH MG: 40 TABLET, FILM COATED ORAL at 08:58

## 2019-12-23 RX ADMIN — CEPHALEXIN SCH MG: 500 CAPSULE ORAL at 08:58

## 2019-12-23 RX ADMIN — Medication SCH OZ: at 09:02

## 2019-12-23 RX ADMIN — Medication SCH MG: at 08:58

## 2019-12-23 NOTE — NUR
RN DISCHARGE NOTE: PATIENT IS A 59 YEAR OLD FEMALE DISCHARGED TO [facility] [place] [address] [facility] [address] [phone]. PATIENT IS 
IN STABLE CONDITION. VSS. NO ACUTE DISTRESS NOTED. NO COMPLAINTS. COMPLIANT WITH MEDICATION 
MANAGEMENT. COOPERATIVE WITH PLAN OF CARE. PSYCHIATRIC TREATMENT PLANS MET. MEDICAL 
TREATMENT PLANS DEFERRED FOR CONTINUAL MONITORING. DENIES SI/HI VAH AT THE TIME OF 
DISCHARGE. SKIN CHECK DONE WITH WOUND PICTURES IN CHART. EDUCATED PATIENT ABOUT AFTERCARE 
WITH COPY PROVIDED. RETURNED PERSONAL BELONGINGS TO PATIENT. MEDICATIONS RECONCILED WITH DR CHANG AND DR MEADOWS ALONG WITH PSYCHIATRIC DISCHARGE ORDERS. DISCHARGE PAPERWORK 
SIGNED. FOR FOLLOW UP WITH PSYCHIATRIST DR BATISTA [id number] [facility] [address] [facility] 
[id number] [phone] AND INTERNIST DR NATHAN COPELAND [id number] [facility] [address] [facility] 
[id number] [phone] IN 1 WEEK.  PATIENT LEFT THE [facility] VIA TRANSPORTATION AT 1400.

## 2019-12-23 NOTE — NUR
Discharge Note: Pt was discharged to [facility] (SNF) located at [address] [place] [address]; [phone]. Pt was 
transported via [facility] at 12PM. Pts friend, Lizett ([phone]), was informed of the 
discharge. Upon discharge, the pt appeared to be in a euthymic mood and presented with a 
calm affect. Pt denied both suicidal and homicidal ideation as well as auditory and visual 
hallucinations. Pt will be under the care of her psychiatrist, Dr. Negrete, located at [address] 
[facility], [place] [address]; [phone] and her internist, Dr. Ger Wooten, located at [address] [facility] [address], [place] [address]; [phone].

-------------------------------------------------------------------------------

Addendum: 12/23/19 at 1209 by MAXWELL CONNOR

-------------------------------------------------------------------------------

NIKOLAS provided the pt with three substances that are listed below and the pt will continue to 
work on her substance abuse with her treatment team.



Substance Abuse Referrals:

[facility]

[address]

 [place] [address]

 Tel. [phone]

[facility]

Primary Care

[facility]

Mental Health Treatment

Tele-dermatology

HIV Services

Telemedicine Services

[facility]

[address] [place] [address]

Phone: [phone]

[facility] 

[address] [place] [address]

Phone: [phone]

## 2019-12-23 NOTE — NUR
Asked pt to do a bladder scan. Pt states, "take me to the toilet first because i want to pee 
on my own. If i can pee i don't need the bladder scan". Assisted pt to toilet. Pt voided 
large amount of urine without difficulty. No c/o pain or discomfort during voiding. Urine 
yellow color and clear of sediments without foul odor noted. Abdomen soft to touch without 
c/o pain and no bladder distention noted. Will continue to monitor.

## 2022-06-05 NOTE — NUR
Hypoglycemia protocol initiated. BSG was 64, 8 oz of juice with thickener was administered. Next BSG was 57, 8 oz of juice with thickener, was administered. Last BSG was 83.    PT. REQUESTS BLADDER SCANNING-RETAINED AMT. 503MLTHEN PT. REQUESTING TO WALK TO 
BATHRM.VOIDED VERY LRG. AMT. IN BATHROOM.